# Patient Record
Sex: FEMALE | Race: WHITE | NOT HISPANIC OR LATINO | Employment: FULL TIME | URBAN - METROPOLITAN AREA
[De-identification: names, ages, dates, MRNs, and addresses within clinical notes are randomized per-mention and may not be internally consistent; named-entity substitution may affect disease eponyms.]

---

## 2017-04-10 ENCOUNTER — OFFICE VISIT (OUTPATIENT)
Dept: ALLERGY | Age: 27
End: 2017-04-10

## 2017-04-10 VITALS
TEMPERATURE: 98.5 F | WEIGHT: 150 LBS | HEIGHT: 68 IN | BODY MASS INDEX: 22.73 KG/M2 | DIASTOLIC BLOOD PRESSURE: 56 MMHG | HEART RATE: 72 BPM | SYSTOLIC BLOOD PRESSURE: 100 MMHG | OXYGEN SATURATION: 99 % | RESPIRATION RATE: 12 BRPM

## 2017-04-10 DIAGNOSIS — J32.9 CHRONIC SINUSITIS, UNSPECIFIED LOCATION: Primary | ICD-10-CM

## 2017-04-10 DIAGNOSIS — J45.20 MILD INTERMITTENT ASTHMA WITHOUT COMPLICATION: ICD-10-CM

## 2017-04-10 LAB
PULM BASE TEST: NORMAL
SPIRO:FEF 25-75%,% PREDICTED: 91
SPIRO:FEF 25-75%,ACTUAL: 3.79
SPIRO:FEF 25-75%,PREDICTED: 4.15
SPIRO:FEF MAX (PEF),% PREDICTED: 107
SPIRO:FEF MAX (PEF),ACTUAL: 7.35
SPIRO:FEF MAX (PEF),PREDICTED: 6.88
SPIRO:FEV1,% PREDICTED: 105
SPIRO:FEV1,ACTUAL: 3.84
SPIRO:FEV1,PREDICTED: 3.67
SPIRO:FEV1/FVC,ACTUAL: 0.82
SPIRO:FEV1/FVC,PREDICTED: 0.85
SPIRO:FVC,% PREDICTED: 108
SPIRO:FVC,ACTUAL: 4.69
SPIRO:FVC,PREDICTED: 4.33

## 2017-04-10 PROCEDURE — 94010 BREATHING CAPACITY TEST: CPT | Performed by: ALLERGY & IMMUNOLOGY

## 2017-04-10 PROCEDURE — 99213 OFFICE O/P EST LOW 20 MIN: CPT | Performed by: ALLERGY & IMMUNOLOGY

## 2017-04-10 RX ORDER — ALBUTEROL SULFATE 90 UG/1
2 AEROSOL, METERED RESPIRATORY (INHALATION) EVERY 4 HOURS PRN
Qty: 1 INHALER | Refills: 0 | Status: SHIPPED | OUTPATIENT
Start: 2017-04-10

## 2017-04-10 ASSESSMENT — PULMONARY FUNCTION TESTS
FEV1_PREDICTED: 3.67
FVC: 4.69
FEV1/FVC: 0.82
FVC_PREDICTED: 4.33
FEV1: 3.84
FEV1/FVC_PREDICTED: 0.85

## 2017-05-31 ENCOUNTER — HOSPITAL ENCOUNTER (OUTPATIENT)
Dept: LAB | Age: 27
Discharge: HOME | End: 2017-05-31
Attending: INTERNAL MEDICINE

## 2017-05-31 DIAGNOSIS — Z00.00 ROUTINE GENERAL MEDICAL EXAMINATION AT A HEALTH CARE FACILITY: ICD-10-CM

## 2017-05-31 PROCEDURE — 86480 TB TEST CELL IMMUN MEASURE: CPT

## 2017-05-31 PROCEDURE — 36415 COLL VENOUS BLD VENIPUNCTURE: CPT

## 2017-06-01 LAB
ANNOTATION COMMENT IMP: NEGATIVE
M TB IFN-G CD4+ BCKGRND COR BLD-ACNC: 0.01 IUNITS/ML
M TB IFN-G CD4+ T-CELLS BLD-ACNC: 0.02 IUNITS/ML
M TB TUBERC IGNF/MITOGEN IGNF CONTROL: 9.34 IUNITS/ML

## 2017-06-05 ENCOUNTER — OFFICE VISIT (OUTPATIENT)
Dept: DERMATOLOGY | Age: 27
End: 2017-06-05

## 2017-06-05 DIAGNOSIS — D22.9 MULTIPLE NEVI: ICD-10-CM

## 2017-06-05 DIAGNOSIS — L81.4 SOLAR LENTIGINOSIS: ICD-10-CM

## 2017-06-05 DIAGNOSIS — L85.8 KP (KERATOSIS PILARIS): Primary | ICD-10-CM

## 2017-06-05 PROCEDURE — 99203 OFFICE O/P NEW LOW 30 MIN: CPT | Performed by: DERMATOLOGY

## 2017-06-27 ENCOUNTER — OFFICE VISIT (OUTPATIENT)
Dept: OBGYN | Age: 27
End: 2017-06-27

## 2017-06-27 ENCOUNTER — TELEPHONE (OUTPATIENT)
Dept: OBGYN | Age: 27
End: 2017-06-27

## 2017-06-27 VITALS
BODY MASS INDEX: 25.61 KG/M2 | HEIGHT: 68 IN | WEIGHT: 169 LBS | SYSTOLIC BLOOD PRESSURE: 104 MMHG | HEART RATE: 68 BPM | DIASTOLIC BLOOD PRESSURE: 54 MMHG

## 2017-06-27 DIAGNOSIS — R10.2 PELVIC PAIN IN FEMALE: ICD-10-CM

## 2017-06-27 DIAGNOSIS — Z00.00 PREVENTATIVE HEALTH CARE: Primary | ICD-10-CM

## 2017-06-27 PROCEDURE — 99395 PREV VISIT EST AGE 18-39: CPT | Performed by: OBSTETRICS & GYNECOLOGY

## 2017-06-27 PROCEDURE — 88175 CYTOPATH C/V AUTO FLUID REDO: CPT | Performed by: INTERNAL MEDICINE

## 2017-06-27 RX ORDER — NORETHINDRONE ACETATE AND ETHINYL ESTRADIOL 1; .02 MG/1; MG/1
1 TABLET ORAL DAILY
Qty: 90 TABLET | Refills: 0 | Status: SHIPPED | OUTPATIENT
Start: 2017-06-27 | End: 2017-07-19 | Stop reason: SDUPTHER

## 2017-06-28 ENCOUNTER — TELEPHONE (OUTPATIENT)
Dept: OBGYN | Age: 27
End: 2017-06-28

## 2017-06-28 DIAGNOSIS — R10.2 PELVIC PAIN IN FEMALE: ICD-10-CM

## 2017-06-28 DIAGNOSIS — Z00.00 PREVENTATIVE HEALTH CARE: ICD-10-CM

## 2017-06-29 LAB — CYTOLOGY CVX/VAG DOC THIN PREP: NORMAL

## 2017-07-19 DIAGNOSIS — R10.2 PELVIC PAIN IN FEMALE: ICD-10-CM

## 2017-07-20 RX ORDER — NORETHINDRONE ACETATE AND ETHINYL ESTRADIOL 1; 20 MG/1; UG/1
TABLET ORAL
Qty: 21 TABLET | Refills: 0 | Status: SHIPPED | OUTPATIENT
Start: 2017-07-20 | End: 2017-08-07 | Stop reason: SDUPTHER

## 2017-08-07 ENCOUNTER — TELEPHONE (OUTPATIENT)
Dept: OBGYN | Age: 27
End: 2017-08-07

## 2017-08-07 DIAGNOSIS — R10.2 PELVIC PAIN IN FEMALE: ICD-10-CM

## 2017-08-07 RX ORDER — NORETHINDRONE ACETATE AND ETHINYL ESTRADIOL 1; .02 MG/1; MG/1
1 TABLET ORAL DAILY
Qty: 21 TABLET | Refills: 0 | Status: SHIPPED | OUTPATIENT
Start: 2017-08-07

## 2018-10-29 NOTE — PATIENT DISCUSSION
EYELID LESION, OD: NO CHANGE IN SIZE PER PATIENT. PATIENT TO SELF MONITOR FOR CHANGES AND CONSULT DR KO IF CHANGES OCCUR FOR POSSIBLE REMOVAL.

## 2022-06-26 RX ORDER — ALBUTEROL SULFATE 90 UG/1
AEROSOL, METERED RESPIRATORY (INHALATION)
COMMUNITY
Start: 2021-07-06

## 2022-09-13 ENCOUNTER — COMPREHENSIVE EXAM (OUTPATIENT)
Dept: URBAN - METROPOLITAN AREA CLINIC 16 | Facility: CLINIC | Age: 32
End: 2022-09-13

## 2022-09-13 DIAGNOSIS — H52.223: ICD-10-CM

## 2022-09-13 DIAGNOSIS — Z01.00: ICD-10-CM

## 2022-09-13 PROCEDURE — 92014 COMPRE OPH EXAM EST PT 1/>: CPT

## 2022-09-13 PROCEDURE — 92015 DETERMINE REFRACTIVE STATE: CPT

## 2022-09-13 ASSESSMENT — KERATOMETRY
OS_AXISANGLE2_DEGREES: 96
OD_K1POWER_DIOPTERS: 41.50
OS_K2POWER_DIOPTERS: 42.00
OD_AXISANGLE_DEGREES: 172
OD_AXISANGLE2_DEGREES: 82
OS_K1POWER_DIOPTERS: 40.75
OS_AXISANGLE_DEGREES: 6
OD_K2POWER_DIOPTERS: 42.25

## 2022-09-13 ASSESSMENT — TONOMETRY
OD_IOP_MMHG: 16
OS_IOP_MMHG: 17

## 2022-09-13 ASSESSMENT — VISUAL ACUITY
OD_SC: 20/20
OS_SC: 20/20-2

## 2023-11-07 ASSESSMENT — KERATOMETRY
OS_K2POWER_DIOPTERS: 42.00
OS_K1POWER_DIOPTERS: 40.75
OS_AXISANGLE_DEGREES: 6
OD_K2POWER_DIOPTERS: 42.25
OS_AXISANGLE2_DEGREES: 96
OD_AXISANGLE_DEGREES: 172
OD_AXISANGLE2_DEGREES: 82
OD_K1POWER_DIOPTERS: 41.50

## 2023-11-14 ENCOUNTER — COMPREHENSIVE EXAM (OUTPATIENT)
Dept: URBAN - METROPOLITAN AREA CLINIC 16 | Facility: CLINIC | Age: 33
End: 2023-11-14

## 2023-11-14 DIAGNOSIS — Z01.00: ICD-10-CM

## 2023-11-14 DIAGNOSIS — H52.223: ICD-10-CM

## 2023-11-14 PROCEDURE — 92015 DETERMINE REFRACTIVE STATE: CPT

## 2023-11-14 PROCEDURE — 92014 COMPRE OPH EXAM EST PT 1/>: CPT

## 2023-11-14 ASSESSMENT — VISUAL ACUITY
OD_SC: 20/20
OS_SC: 20/20-2

## 2023-11-14 ASSESSMENT — TONOMETRY
OD_IOP_MMHG: 12
OS_IOP_MMHG: 13